# Patient Record
Sex: FEMALE | Race: WHITE | NOT HISPANIC OR LATINO | URBAN - METROPOLITAN AREA
[De-identification: names, ages, dates, MRNs, and addresses within clinical notes are randomized per-mention and may not be internally consistent; named-entity substitution may affect disease eponyms.]

---
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
## 2024-03-05 ENCOUNTER — TELEPHONE (OUTPATIENT)
Age: 24
End: 2024-03-05

## 2024-03-05 NOTE — TELEPHONE ENCOUNTER
Patient called wanting to schedule an appointment to see Dr. Yesenia Villalobos. She states that her mother Baltazar Johnson had an appointment with her and she was discussing the connections with auto immune disease and PCOS.  I was not sure if Dr. Villalobos can see her or if she could just call the patient. Please contact Cathi at [phone]

## 2024-03-06 NOTE — TELEPHONE ENCOUNTER
I had OV with mother Baltazar Johnson yesterday who asked if I could help her daughter.  I am not limited to just menopause, I can see anyone, male or female, of any age with hormonal issues!!  Please call her to schedule daughter's appt. NON URGENT warn her this summer is fine  [place]

## 2024-03-11 ENCOUNTER — TELEPHONE (OUTPATIENT)
Age: 24
End: 2024-03-11

## 2024-08-19 ENCOUNTER — OFFICE VISIT (OUTPATIENT)
Age: 24
End: 2024-08-19
Payer: COMMERCIAL

## 2024-08-19 DIAGNOSIS — N95.1 PERIMENOPAUSAL SYMPTOMS: Primary | ICD-10-CM

## 2024-08-19 PROBLEM — G50.0 TRIGEMINAL NEURALGIA: Status: ACTIVE | Noted: 2024-02-08

## 2024-08-19 PROBLEM — K21.9 GASTROESOPHAGEAL REFLUX DISEASE WITHOUT ESOPHAGITIS: Status: ACTIVE | Noted: 2022-08-08

## 2024-08-19 PROBLEM — G90.A POTS (POSTURAL ORTHOSTATIC TACHYCARDIA SYNDROME): Status: ACTIVE | Noted: 2024-03-14

## 2024-08-19 PROBLEM — G43.E09 CHRONIC MIGRAINE WITH AURA: Status: ACTIVE | Noted: 2018-09-29

## 2024-08-19 PROBLEM — K31.84 GASTROPARESIS: Status: ACTIVE | Noted: 2022-08-08

## 2024-08-19 PROBLEM — G90.1 DYSAUTONOMIA (HCC): Chronic | Status: ACTIVE | Noted: 2024-03-14

## 2024-08-19 PROBLEM — E04.1 CYST OF THYROID: Status: ACTIVE | Noted: 2022-08-08

## 2024-08-19 PROBLEM — K29.50 CHRONIC GASTRITIS: Status: ACTIVE | Noted: 2024-03-14

## 2024-08-19 PROBLEM — M35.7 BENIGN HYPERMOBILITY SYNDROME: Status: ACTIVE | Noted: 2022-08-01

## 2024-08-19 PROBLEM — J45.909 ASTHMA: Status: ACTIVE | Noted: 2024-03-14

## 2024-08-19 PROBLEM — M94.0 COSTOCHONDRITIS: Status: ACTIVE | Noted: 2024-08-19

## 2024-08-19 PROBLEM — K82.8 BILIARY DYSKINESIA: Status: ACTIVE | Noted: 2023-04-14

## 2024-08-19 PROBLEM — J45.30 MILD PERSISTENT ASTHMA WITHOUT COMPLICATION: Status: ACTIVE | Noted: 2018-09-29

## 2024-08-19 PROBLEM — M79.7 FIBROMYALGIA: Status: ACTIVE | Noted: 2024-03-14

## 2024-08-19 PROBLEM — I73.00 RAYNAUD'S DISEASE: Status: ACTIVE | Noted: 2024-03-14

## 2024-08-19 PROBLEM — L05.91 PILONIDAL CYST: Status: ACTIVE | Noted: 2024-05-13

## 2024-08-19 PROCEDURE — 99205 OFFICE O/P NEW HI 60 MIN: CPT | Performed by: OBSTETRICS & GYNECOLOGY

## 2024-08-19 RX ORDER — MEDROXYPROGESTERONE ACETATE 150 MG/ML
INJECTION, SUSPENSION INTRAMUSCULAR
COMMUNITY

## 2024-08-19 RX ORDER — DULOXETIN HYDROCHLORIDE 30 MG/1
30 CAPSULE, DELAYED RELEASE ORAL DAILY
COMMUNITY

## 2024-08-19 RX ORDER — FLUTICASONE PROPIONATE AND SALMETEROL XINAFOATE 115; 21 UG/1; UG/1
2 AEROSOL, METERED RESPIRATORY (INHALATION) 2 TIMES DAILY
COMMUNITY
Start: 2024-05-15

## 2024-08-19 RX ORDER — MONTELUKAST SODIUM 10 MG/1
TABLET ORAL
COMMUNITY

## 2024-08-19 RX ORDER — MIDODRINE HYDROCHLORIDE 5 MG/1
7.5 TABLET ORAL 2 TIMES DAILY
COMMUNITY
Start: 2024-05-06

## 2024-08-19 RX ORDER — PREGABALIN 50 MG/1
50 CAPSULE ORAL
COMMUNITY

## 2024-08-19 RX ORDER — PRUCALOPRIDE 2 MG/1
1 TABLET, FILM COATED ORAL DAILY
COMMUNITY

## 2024-08-19 RX ORDER — METOPROLOL SUCCINATE 50 MG/1
50 TABLET, EXTENDED RELEASE ORAL 2 TIMES DAILY
COMMUNITY

## 2024-08-19 RX ORDER — ERENUMAB-AOOE 140 MG/ML
INJECTION, SOLUTION SUBCUTANEOUS
COMMUNITY
Start: 2024-07-19

## 2024-08-19 RX ORDER — ESTRADIOL 0.03 MG/D
1 FILM, EXTENDED RELEASE TRANSDERMAL 2 TIMES WEEKLY
Qty: 8 PATCH | Refills: 11 | Status: SHIPPED | OUTPATIENT
Start: 2024-08-19

## 2024-08-19 RX ORDER — MODAFINIL 100 MG/1
100 TABLET ORAL DAILY
COMMUNITY

## 2024-08-19 RX ORDER — PREGABALIN 25 MG/1
CAPSULE ORAL
COMMUNITY
Start: 2024-07-01

## 2024-08-19 RX ORDER — DULOXETIN HYDROCHLORIDE 60 MG/1
60 CAPSULE, DELAYED RELEASE ORAL DAILY
COMMUNITY

## 2024-08-19 RX ORDER — NALTREXONE 100 %
POWDER (GRAM) MISCELLANEOUS
COMMUNITY
Start: 2024-07-03

## 2024-08-19 RX ORDER — BACLOFEN 5 MG/1
5 TABLET ORAL
COMMUNITY
Start: 2024-04-08

## 2024-08-19 RX ORDER — FROVATRIPTAN SUCCINATE 2.5 MG/1
TABLET, FILM COATED ORAL
COMMUNITY
Start: 2024-07-19

## 2024-08-23 NOTE — PROGRESS NOTES
"Assessment/Plan:      Diagnoses and all orders for this visit:    Perimenopausal symptoms  -     estradiol (Lexy) 0.025 MG/24HR; Place 1 patch on the skin 2 (two) times a week    Other orders  -     Baclofen 5 MG TABS; Take 5 mg by mouth  -     DULoxetine (CYMBALTA) 30 mg delayed release capsule; Take 30 mg by mouth daily  -     DULoxetine (CYMBALTA) 60 mg delayed release capsule; Take 60 mg by mouth daily  -     Aimovig 140 MG/ML SOAJ; INJECT 1 ML SUBCUTANEOUSLY EVERY 30 DAYS  -     fluticasone-salmeterol (ADVAIR HFA) 115-21 MCG/ACT inhaler; Inhale 2 puffs 2 (two) times a day  -     frovatriptan (FROVA) 2.5 MG tablet; 1 AT ONSET OF MIGRAINE, MAY REPEAT X1 IN 2 HOURS MAX 2 IN 24 HOURS  -     medroxyPROGESTERone acetate (DEPO-PROVERA SYRINGE) 150 mg/mL injection; INJECT EVERY 10-13 WEEKS  -     metoprolol succinate (TOPROL-XL) 50 mg 24 hr tablet; Take 50 mg by mouth 2 (two) times a day  -     midodrine (PROAMATINE) 5 mg tablet; Take 7.5 mg by mouth 2 (two) times a day  -     modafinil (PROVIGIL) 100 mg tablet; Take 100 mg by mouth daily  -     montelukast (SINGULAIR) 10 mg tablet; take 1 tablet by mouth every day for 30 days  -     Naltrexone POWD; 2mg daily  -     pregabalin (LYRICA) 25 mg capsule; Take by mouth  -     pregabalin (LYRICA) 50 mg capsule; Take 50 mg by mouth daily at bedtime  -     Motegrity 2 MG TABS; Take 1 tablet by mouth daily        1) Discussed the physiology of the menstrual cycle, PCO (which I do NOT believe she has and can not evaluate while on ovarian suppression).   2) It appears that Depo Provera is successful in relieving her symptoms, but I agree, black box warning after 2 years due to progressive bone loss after this. Options for future management:  A) Continue Depo Provera, but I would \" add back\" low dose estradiol patch to simulate same add back therapy as seen with Lupron. This would prevent bone issues but still afford amenorrhea and pelvic pain control.   B) Discontinue Depo " Provera, consider local IUD  C) Discontinue Depo Provera with expecant management to see if heavy periods resolve by now, not sure this will be successful. I do believe her acne is due to Depo Provera, with known androgenic side effects, not PCO.  3) She opts for choice one. Will start with lowest dose estradiol patch 0.025 mg for 3 months, watching for return of pelvic symptoms. IF tolerated, advance to 0.0375 mg dose and would love if she could get to 0.05 mg dose eventually. May continue this until childbearing desired, then may discontinue all.  4) Email with follow up in 3 months or prn for dose adjustment. Follow up prn or one year . She sees her normal gyn in [place] for screening and exams.     This was a 60 minute visit with greater than 50% of time spent in face to face counseling and coordination of care.    Subjective:     Patient ID: Cathi Johnson is a 23 y.o. female.    Cathi presents for hormone consult, her first visit with me; referred by her mother, a patient of mine. Sees gyn at [facility] in [facility].   Cathi is obviously premenopausal, Her story:  1) Started having fibromyalgia type pains since age 13, at onset of menarche instead. Periods always irregular, heavy and prolonged. Put on OCP age 17 and symptoms improved.   2) Started having GI symptoms with gastroparesis, dysautonomia, biliary dyskinesia s/p nava. Now treating dysmotility with Motegrity, working very well.   3)  Several months after starting OCP developed migraine with aura. OCP stopped, tried POP. Not effective. Gyn changed to Depo Provera, which she has been on for 3 years. While helpful for GI symptoms, knows that she should not be on for long term due to bone loss and other symptoms of hormone decline, but feels like this is the only thing that helps.   4) Had US in 2023, diagnosed with PCO, but was on Depo Provera at the time. Occasional random spotting, particularly after antibiotic.   5) Still feels fatigued, random pains.    PMXH: as above; hx of POTS, dysautonomia; chronic fatigue, on Provigel very helpful.   SHX: student at [employer], studying astrophysics. Denies tobacco, ETOH  FHX: non contributory.     Review of Systems   Constitutional:  Positive for fatigue. Negative for activity change and appetite change.   Respiratory: Negative.     Cardiovascular: Negative.    Gastrointestinal:  Positive for abdominal distention, constipation and nausea.   Endocrine: Negative.    Genitourinary:  Positive for menstrual problem and pelvic pain.   Musculoskeletal:  Positive for arthralgias.   Skin:         Acne on Depo Provera   Neurological:  Positive for headaches.       Objective:     Physical Exam

## 2024-12-03 DIAGNOSIS — N92.4 EXCESSIVE BLEEDING IN PREMENOPAUSAL PERIOD: Primary | ICD-10-CM

## 2024-12-03 RX ORDER — ACETAMINOPHEN AND CODEINE PHOSPHATE 120; 12 MG/5ML; MG/5ML
1 SOLUTION ORAL DAILY
Qty: 28 TABLET | Refills: 6 | Status: SHIPPED | OUTPATIENT
Start: 2024-12-03

## 2024-12-24 DIAGNOSIS — N92.4 EXCESSIVE BLEEDING IN PREMENOPAUSAL PERIOD: ICD-10-CM

## 2024-12-24 RX ORDER — ACETAMINOPHEN AND CODEINE PHOSPHATE 120; 12 MG/5ML; MG/5ML
1 SOLUTION ORAL DAILY
Qty: 84 TABLET | Refills: 1 | Status: SHIPPED | OUTPATIENT
Start: 2024-12-24

## 2025-06-17 DIAGNOSIS — N92.4 EXCESSIVE BLEEDING IN PREMENOPAUSAL PERIOD: ICD-10-CM

## 2025-06-17 RX ORDER — NORETHINDRONE 0.35 MG/1
1 TABLET ORAL DAILY
Qty: 28 TABLET | Refills: 2 | Status: SHIPPED | OUTPATIENT
Start: 2025-06-17

## 2025-06-17 NOTE — TELEPHONE ENCOUNTER
Patient needs an appointment. Please contact the patient to schedule an appointment. Last office visit: 8/19/24